# Patient Record
Sex: MALE | Race: WHITE | NOT HISPANIC OR LATINO | Employment: FULL TIME | URBAN - METROPOLITAN AREA
[De-identification: names, ages, dates, MRNs, and addresses within clinical notes are randomized per-mention and may not be internally consistent; named-entity substitution may affect disease eponyms.]

---

## 2019-06-17 ENCOUNTER — OFFICE VISIT (OUTPATIENT)
Dept: FAMILY MEDICINE | Facility: CLINIC | Age: 39
End: 2019-06-17
Payer: COMMERCIAL

## 2019-06-17 VITALS
RESPIRATION RATE: 16 BRPM | OXYGEN SATURATION: 98 % | WEIGHT: 192 LBS | DIASTOLIC BLOOD PRESSURE: 76 MMHG | SYSTOLIC BLOOD PRESSURE: 130 MMHG | TEMPERATURE: 101.2 F | HEART RATE: 86 BPM | BODY MASS INDEX: 26.88 KG/M2 | HEIGHT: 71 IN

## 2019-06-17 DIAGNOSIS — A69.20 ERYTHEMA MIGRANS (LYME DISEASE): Primary | ICD-10-CM

## 2019-06-17 PROCEDURE — 99203 OFFICE O/P NEW LOW 30 MIN: CPT | Performed by: PHYSICIAN ASSISTANT

## 2019-06-17 RX ORDER — DOXYCYCLINE 100 MG/1
100 TABLET ORAL 2 TIMES DAILY
Qty: 42 TABLET | Refills: 0 | Status: SHIPPED | OUTPATIENT
Start: 2019-06-17 | End: 2019-07-08

## 2019-06-17 ASSESSMENT — ENCOUNTER SYMPTOMS
DIARRHEA: 0
CHILLS: 0
MYALGIAS: 1
HEADACHES: 0
WHEEZING: 0
EYE DISCHARGE: 0
PALPITATIONS: 0
SHORTNESS OF BREATH: 0
EYE REDNESS: 0
FEVER: 1
COUGH: 0
SORE THROAT: 0
VOMITING: 0
ABDOMINAL PAIN: 0
NAUSEA: 0
BLURRED VISION: 0

## 2019-06-17 ASSESSMENT — MIFFLIN-ST. JEOR: SCORE: 1800.1

## 2019-06-17 NOTE — PROGRESS NOTES
Subjective     Florentin Porter is a 39 year old male who presents to clinic today for the following health issues:    HPI   Insect bites       Duration: Saturday night     Description (location/character/radiation): Insect bites- right side of back, has had several ticks on him this year     Intensity:  moderate    Accompanying signs and symptoms: started not to feel good yesterday, loss of sleep, fever started Sunday morning, body aches, headache, no nausea or vomiting, chills/sweats, bug bite has redness and swelling.     History (similar episodes/previous evaluation): None    Precipitating or alleviating factors: None    Therapies tried and outcome: Claritin, allegra topical cream          There is no problem list on file for this patient.    History reviewed. No pertinent surgical history.    Social History     Tobacco Use     Smoking status: Never Smoker     Smokeless tobacco: Never Used   Substance Use Topics     Alcohol use: No     History reviewed. No pertinent family history.      Current Outpatient Medications   Medication Sig Dispense Refill     doxycycline monohydrate (ADOXA) 100 MG tablet Take 1 tablet (100 mg) by mouth 2 times daily for 21 days 42 tablet 0     cephALEXin (KEFLEX) 500 MG capsule Take 1 capsule (500 mg) by mouth 2 times daily (Patient not taking: Reported on 6/17/2019) 10 capsule 0     No Known Allergies      Reviewed and updated as needed this visit by Provider  Tobacco  Allergies  Meds  Problems  Med Hx  Surg Hx  Fam Hx       Review of Systems   Constitutional: Positive for fever. Negative for chills and malaise/fatigue.   HENT: Negative for congestion, ear pain and sore throat.    Eyes: Negative for blurred vision, discharge and redness.   Respiratory: Negative for cough, shortness of breath and wheezing.    Cardiovascular: Negative for chest pain and palpitations.   Gastrointestinal: Negative for abdominal pain, diarrhea, nausea and vomiting.   Musculoskeletal: Positive for  "myalgias. Negative for joint pain.   Skin: Positive for rash.   Neurological: Negative for headaches.           Objective    /76   Pulse 86   Temp 101.2  F (38.4  C) (Tympanic)   Resp 16   Ht 1.791 m (5' 10.5\")   Wt 87.1 kg (192 lb)   SpO2 98%   BMI 27.16 kg/m    Body mass index is 27.16 kg/m .    Physical Exam   Constitutional: He appears well-developed and well-nourished. No distress.   HENT:   Head: Normocephalic and atraumatic.   Right Ear: Tympanic membrane and ear canal normal.   Left Ear: Tympanic membrane and ear canal normal.   Mouth/Throat: Oropharynx is clear and moist.   Eyes: Pupils are equal, round, and reactive to light. Conjunctivae are normal.   Cardiovascular: Normal rate and regular rhythm.   Pulmonary/Chest: Effort normal and breath sounds normal.   Skin: Skin is warm and dry.        Bullseye rash on right side   Psychiatric: He has a normal mood and affect. His behavior is normal.         Diagnostic Test Results:  none         Assessment & Plan     1. Erythema migrans (Lyme disease)  Will treat with doxycyline x 21 days. Continue with supportive care. Get plenty of rest and push fluids. Return to clinic if symptoms worsen or do not improve; otherwise follow up as needed      - doxycycline monohydrate (ADOXA) 100 MG tablet; Take 1 tablet (100 mg) by mouth 2 times daily for 21 days  Dispense: 42 tablet; Refill: 0       Lydia Reed PA-C  [facility]      "

## 2024-07-30 ENCOUNTER — OFFICE VISIT (OUTPATIENT)
Dept: DERMATOLOGY | Facility: CLINIC | Age: 44
End: 2024-07-30
Payer: COMMERCIAL

## 2024-07-30 DIAGNOSIS — C44.1122 BASAL CELL CARCINOMA (BCC) OF RIGHT LOWER EYELID: ICD-10-CM

## 2024-07-30 DIAGNOSIS — D23.9 DERMAL NEVUS: ICD-10-CM

## 2024-07-30 DIAGNOSIS — L81.4 LENTIGO: ICD-10-CM

## 2024-07-30 DIAGNOSIS — D23.9 DERMATOFIBROMA: Primary | ICD-10-CM

## 2024-07-30 DIAGNOSIS — D18.01 ANGIOMA OF SKIN: ICD-10-CM

## 2024-07-30 PROCEDURE — 88331 PATH CONSLTJ SURG 1 BLK 1SPC: CPT | Performed by: DERMATOLOGY

## 2024-07-30 PROCEDURE — 99203 OFFICE O/P NEW LOW 30 MIN: CPT | Mod: 25 | Performed by: DERMATOLOGY

## 2024-07-30 PROCEDURE — 11102 TANGNTL BX SKIN SINGLE LES: CPT | Performed by: DERMATOLOGY

## 2024-07-30 NOTE — PATIENT INSTRUCTIONS
Wound Care Instructions     FOR SUPERFICIAL WOUNDS     [facility] [phone]    [facility] [phone]                       AFTER 24 HOURS YOU SHOULD REMOVE THE BANDAGE AND BEGIN DAILY DRESSING CHANGES AS FOLLOWS:     1) Remove Dressing.     2) Clean and dry the area with tap water using a Q-tip or sterile gauze pad.     3) Apply Vaseline, Aquaphor, Polysporin ointment or Bacitracin ointment over entire wound.  Do NOT use Neosporin ointment.     4) Cover the wound with a band-aid, or a sterile non-stick gauze pad and micropore paper tape      REPEAT THESE INSTRUCTIONS AT LEAST ONCE A DAY UNTIL THE WOUND HAS COMPLETELY HEALED.    It is an old wives tale that a wound heals better when it is exposed to air and allowed to dry out. The wound will heal faster with a better cosmetic result if it is kept moist with ointment and covered with a bandage.    **Do not let the wound dry out.**      Supplies Needed:      *Cotton tipped applicators (Q-tips)    *Polysporin Ointment or Bacitracin Ointment (NOT NEOSPORIN)    *Band-aids or non-stick gauze pads and micropore paper tape.      PATIENT INFORMATION:    During the healing process you will notice a number of changes. All wounds develop a small halo of redness surrounding the wound.  This means healing is occurring. Severe itching with extensive redness usually indicates sensitivity to the ointment or bandage tape used to dress the wound.  You should call our office if this develops.      Swelling  and/or discoloration around your surgical site is common, particularly when performed around the eye.    All wounds normally drain.  The larger the wound the more drainage there will be.  After 7-10 days, you will notice the wound beginning to shrink and new skin will begin to grow.  The wound is healed when you can see skin has formed over the entire area.  A healed wound has a healthy, shiny look to the surface and is red to dark pink in color  to normalize.  Wounds may take approximately 4-6 weeks to heal.  Larger wounds may take 6-8 weeks.  After the wound is healed you may discontinue dressing changes.    You may experience a sensation of tightness as your wound heals. This is normal and will gradually subside.    Your healed wound may be sensitive to temperature changes. This sensitivity improves with time, but if you re having a lot of discomfort, try to avoid temperature extremes.    Patients frequently experience itching after their wound appears to have healed because of the continue healing under the skin.  Plain Vaseline will help relieve the itching.        POSSIBLE COMPLICATIONS    BLEEDING:    Leave the bandage in place.  Use tightly rolled up gauze or a cloth to apply direct pressure over the bandage for 30  minutes.  Reapply pressure for an additional 30 minutes if necessary  Use additional gauze and tape to maintain pressure once the bleeding has stopped.         Proper skin care from [facility]:    -Eliminate harsh soaps as they strip the natural oils from the skin, often resulting in dry itchy skin ( i.e. Dial, Zest, [place] Spring)  -Use mild soaps such as Cetaphil or Dove Sensitive Skin in the shower. You do not need to use soap on arms, legs, and trunk every time you shower unless visibly soiled.   -Avoid hot or cold showers.  -After showering, lightly dry off and apply moisturizing within 2-3 minutes. This will help trap moisture in the skin.   -Aggressive use of a moisturizer at least 1-2 times a day to the entire body (including -Vanicream, Cetaphil, Aquaphor or Cerave) and moisturize hands after every washing.  -We recommend using moisturizers that come in a tub that needs to be scooped out, not a pump. This has more of an oil base. It will hold moisture in your skin much better than a water base moisturizer. The above recommended are non-pore clogging.      Wear a sunscreen with at least SPF 30 on your face, ears, neck and  V of the chest daily. Wear sunscreen on other areas of the body if those areas are exposed to the sun throughout the day. Sunscreens can contain physical and/or chemical blockers. Physical blockers are less likely to clog pores, these include zinc oxide and titanium dioxide. Reapply every two hour and after swimming.     Sunscreen examples: https://www.ewg.org/sunscreen/    UV radiation  UVA radiation remains constant throughout the day and throughout the year. It is a longer wavelength than UVB and therefore penetrates deeper into the skin leading to immediate and delayed tanning, photoaging, and skin cancer. 70-80% of UVA and UVB radiation occurs between the hours of 10am-2pm.  UVB radiation  UVB radiation causes the most harmful effects and is more significant during the summer months. However, snow and ice can reflect UVB radiation leading to skin damage during the winter months as well. UVB radiation is responsible for tanning, burning, inflammation, delayed erythema (pinkness), pigmentation (brown spots), and skin cancer.     I recommend self monthly full body exams and yearly full body exams with a dermatology provider. If you develop a new or changing lesion please follow up for examination. Most skin cancers are pink and scaly or pink and pearly. However, we do see blue/brown/black skin cancers.  Consider the ABCDEs of melanoma when giving yourself your monthly full body exam ( don't forget the groin, buttocks, feet, toes, etc). A-asymmetry, B-borders, C-color, D-diameter, E-elevation or evolving. If you see any of these changes please follow up in clinic. If you cannot see your back I recommend purchasing a hand held mirror to use with a larger wall mirror.       Checking for Skin Cancer  You can find cancer early by checking your skin each month. There are 3 kinds of skin cancer. They are melanoma, basal cell carcinoma, and squamous cell carcinoma. Doing monthly skin checks is the best way to find new  marks or skin changes. Follow the instructions below for checking your skin.   The ABCDEs of checking moles for melanoma   Check your moles or growths for signs of melanoma using ABCDE:   Asymmetry: the sides of the mole or growth don t match  Border: the edges are ragged, notched, or blurred  Color: the color within the mole or growth varies  Diameter: the mole or growth is larger than 6 mm (size of a pencil eraser)  Evolving: the size, shape, or color of the mole or growth is changing (evolving is not shown in the images below)    Checking for other types of skin cancer  Basal cell carcinoma or squamous cell carcinoma have symptoms such as:     A spot or mole that looks different from all other marks on your skin  Changes in how an area feels, such as itching, tenderness, or pain  Changes in the skin's surface, such as oozing, bleeding, or scaliness  A sore that does not heal  New swelling or redness beyond the border of a mole    Who s at risk?  Anyone can get skin cancer. But you are at greater risk if you have:   Fair skin, light-colored hair, or light-colored eyes  Many moles or abnormal moles on your skin  A history of sunburns from sunlight or tanning beds  A family history of skin cancer  A history of exposure to radiation or chemicals  A weakened immune system  If you have had skin cancer in the past, you are at risk for recurring skin cancer.   How to check your skin  Do your monthly skin checkups in front of a full-length mirror. Check all parts of your body, including your:   Head (ears, face, neck, and scalp)  Torso (front, back, and sides)  Arms (tops, undersides, upper, and lower armpits)  Hands (palms, backs, and fingers, including under the nails)  Buttocks and genitals  Legs (front, back, and sides)  Feet (tops, soles, toes, including under the nails, and between toes)  If you have a lot of moles, take digital photos of them each month. Make sure to take photos both up close and from a distance.  These can help you see if any moles change over time.   Most skin changes are not cancer. But if you see any changes in your skin, call your doctor right away. Only he or she can diagnose a problem. If you have skin cancer, seeing your doctor can be the first step toward getting the treatment that could save your life.   [employer] last reviewed this educational content on 4/1/2019 2000-2020 [employer]. [address] [place] [address], [place] [address]. All rights reserved. This information is not intended as a substitute for professional medical care. Always follow your healthcare professional's instructions.       When should I call my doctor?  If you are worsening or not improving, please, contact us or seek urgent care as noted below.     Who should I call with questions (adults)?    [facility] [phone]  For urgent needs outside of business hours call the [facility] at [phone] and ask for the dermatology resident on call to be paged  If this is a medical emergency and you are unable to reach an ER, Call [phone]      If you need a prescription refill, please contact your pharmacy. Refills are approved or denied by our Physicians during normal business hours, Monday through Fridays  Per office policy, refills will not be granted if you have not been seen within the past year (or sooner depending on your child's condition)

## 2024-07-30 NOTE — PROGRESS NOTES
Florentin Porter is an extremely pleasant 44 year old year old male patient here today for spot on right eyelid and back.  Patient has no other skin complaints today.  Remainder of the HPI, Meds, PMH, Allergies, FH, and SH was reviewed in chart.    History reviewed. No pertinent past medical history.    No past surgical history on file.     Family History   Problem Relation Age of Onset    Skin Cancer Father     Skin Cancer Paternal Grandfather        Social History     Socioeconomic History    Marital status:      Spouse name: Not on file    Number of children: Not on file    Years of education: Not on file    Highest education level: Not on file   Occupational History    Not on file   Tobacco Use    Smoking status: Never    Smokeless tobacco: Never   Substance and Sexual Activity    Alcohol use: No    Drug use: No    Sexual activity: Not on file   Other Topics Concern    Parent/sibling w/ CABG, MI or angioplasty before 65F 55M? Not Asked   Social History Narrative    Not on file     Social Determinants of Health     Financial Resource Strain: Not on file   Food Insecurity: Not on file   Transportation Needs: Not on file   Physical Activity: Not on file   Stress: Not on file   Social Connections: Not on file   Interpersonal Safety: Not on file   Housing Stability: Not on file       Outpatient Encounter Medications as of 7/30/2024   Medication Sig Dispense Refill    cephALEXin (KEFLEX) 500 MG capsule Take 1 capsule (500 mg) by mouth 2 times daily (Patient not taking: Reported on 6/17/2019) 10 capsule 0     No facility-administered encounter medications on file as of 7/30/2024.             O:   NAD, WDWN, Alert & Oriented, Mood & Affect wnl, Vitals stable   General appearance normal   Vitals stable   Alert, oriented and in no acute distress   R lower lid 6mm pink pearly papule   L shoulder firm papule      brown macules on trunk and ext   Red papules on trunk  Flesh colored papules on trunk         Eyes:  Conjunctivae/lids:Normal     ENT: Lips, mucosa: normal    MSK:Normal    Cardiovascular: peripheral edema none    Pulm: Breathing Normal    Neuro/Psych: Orientation:Alert and Orientedx3 ; Mood/Affect:normal       MICRO:   R lower lid:Orthokeratosis of epidermis with a proliferation of nests of basaloid cells, with peripheral palisading and a haphazard arrangement in the center extending into the dermis, forming nodules.  The tumor cells have hyperchromatic nuclei. Poor cytoplasm and intercellular bridging.    A/P:  1. Dermatofibroma, lentigo, angioma, dermal nevus  2. R lower lid r/o basal cell carcinoma   TANGENTIAL BIOPSY IN HOUSE:  After consent, anesthesia with LEC and prep, tangential excision performed and dx above confirmed with frozen section histology.  No complications and routine wound care.  Patient is not on  anticoagulants and risk of bleeding discussed with patient.       I have personally reviewed all specimens and/or slides and used them with my medical judgement to determine or confirm the final diagnosis.     Patient told result basal cell carcinoma   Schedule excision .      It was a pleasure speaking to Florentin Porter today.  Previous clinic notes and pertinent laboratory tests were reviewed prior to Florentin Porter's visit.  Nature and genetics of benign skin lesions dicussed with patient.  Signs and Symptoms of skin cancer discussed with patient.  Patient encouraged to perform monthly skin exams.  UV precautions reviewed with patient.  Return to clinic next appt

## 2024-07-30 NOTE — LETTER
7/30/2024      Florentin Porter  [address]  [facility] [address]      Dear Colleague,    Thank you for referring your patient, Florentin Porter, to the [facility]. Please see a copy of my visit note below.    Florentin Porter is an extremely pleasant 44 year old year old male patient here today for spot on right eyelid and back.  Patient has no other skin complaints today.  Remainder of the HPI, Meds, PMH, Allergies, FH, and SH was reviewed in chart.    History reviewed. No pertinent past medical history.    No past surgical history on file.     Family History   Problem Relation Age of Onset     Skin Cancer Father      Skin Cancer Paternal Grandfather        Social History     Socioeconomic History     Marital status:      Spouse name: Not on file     Number of children: Not on file     Years of education: Not on file     Highest education level: Not on file   Occupational History     Not on file   Tobacco Use     Smoking status: Never     Smokeless tobacco: Never   Substance and Sexual Activity     Alcohol use: No     Drug use: No     Sexual activity: Not on file   Other Topics Concern     Parent/sibling w/ CABG, MI or angioplasty before 65F 55M? Not Asked   Social History Narrative     Not on file     Social Determinants of Health     Financial Resource Strain: Not on file   Food Insecurity: Not on file   Transportation Needs: Not on file   Physical Activity: Not on file   Stress: Not on file   Social Connections: Not on file   Interpersonal Safety: Not on file   Housing Stability: Not on file       Outpatient Encounter Medications as of 7/30/2024   Medication Sig Dispense Refill     cephALEXin (KEFLEX) 500 MG capsule Take 1 capsule (500 mg) by mouth 2 times daily (Patient not taking: Reported on 6/17/2019) 10 capsule 0     No facility-administered encounter medications on file as of 7/30/2024.             O:   NAD, WDWN, Alert & Oriented, Mood & Affect wnl, Vitals stable   General  appearance normal   Vitals stable   Alert, oriented and in no acute distress   R lower lid 6mm pink pearly papule   L shoulder firm papule      brown macules on trunk and ext   Red papules on trunk  Flesh colored papules on trunk         Eyes: Conjunctivae/lids:Normal     ENT: Lips, mucosa: normal    MSK:Normal    Cardiovascular: peripheral edema none    Pulm: Breathing Normal    Neuro/Psych: Orientation:Alert and Orientedx3 ; Mood/Affect:normal       MICRO:   R lower lid:Orthokeratosis of epidermis with a proliferation of nests of basaloid cells, with peripheral palisading and a haphazard arrangement in the center extending into the dermis, forming nodules.  The tumor cells have hyperchromatic nuclei. Poor cytoplasm and intercellular bridging.    A/P:  1. Dermatofibroma, lentigo, angioma, dermal nevus  2. R lower lid r/o basal cell carcinoma   TANGENTIAL BIOPSY IN HOUSE:  After consent, anesthesia with LEC and prep, tangential excision performed and dx above confirmed with frozen section histology.  No complications and routine wound care.  Patient is not on  anticoagulants and risk of bleeding discussed with patient.       I have personally reviewed all specimens and/or slides and used them with my medical judgement to determine or confirm the final diagnosis.     Patient told result basal cell carcinoma   Schedule excision .      It was a pleasure speaking to Florentin Poretr today.  Previous clinic notes and pertinent laboratory tests were reviewed prior to Florentin Porter's visit.  Nature and genetics of benign skin lesions dicussed with patient.  Signs and Symptoms of skin cancer discussed with patient.  Patient encouraged to perform monthly skin exams.  UV precautions reviewed with patient.  Return to clinic next appt      Again, thank you for allowing me to participate in the care of your patient.        Sincerely,        Harsha Raman MD

## 2024-07-30 NOTE — LETTER
[facility]  [address] [place] [address]  [phone]      July 30, 2024    Florentin Porter  [address] [place] [address]      Dear Florentin      You are scheduled for Mohs Surgery on     Please check in at 2nd floor Dermatology Clinic.     Be sure to eat a good breakfast and bathe and wash your hair prior to Surgery. Please bring  with you if this is above your neck    If you are taking any anti-coagulants that are prescribed by your Doctor (such as Coumadin/warfarin, Plavix, Aspirin, Ibuprofen), please continue taking them.     However, If you are taking anti-coagulants over the counter without  a Doctor's order for a Medical condition, please discontinue them 10 days prior to Surgery.      Please wear loose comfortable clothing as it could possibly be 4-6 hours until your surgery is completed depending upon how many layers of tissue need to be removed.     Thank you,    Harsha Raman MD

## 2024-08-07 ENCOUNTER — OFFICE VISIT (OUTPATIENT)
Dept: DERMATOLOGY | Facility: CLINIC | Age: 44
End: 2024-08-07
Payer: COMMERCIAL

## 2024-08-07 DIAGNOSIS — C44.1122 BASAL CELL CARCINOMA (BCC) OF RIGHT LOWER EYELID: ICD-10-CM

## 2024-08-07 PROCEDURE — 13152 CMPLX RPR E/N/E/L 2.6-7.5 CM: CPT | Performed by: DERMATOLOGY

## 2024-08-07 PROCEDURE — 17311 MOHS 1 STAGE H/N/HF/G: CPT | Performed by: DERMATOLOGY

## 2024-08-07 NOTE — PROGRESS NOTES
Florentin Porter is an extremely pleasant 44 year old year old male patient here today for evaluation and managment of basal cell carcinoma on right lower eyelid.  Patient has no other skin complaints today.  Remainder of the HPI, Meds, PMH, Allergies, FH, and SH was reviewed in chart.      Past Medical History:   Diagnosis Date    Basal cell carcinoma        History reviewed. No pertinent surgical history.     Family History   Problem Relation Age of Onset    Skin Cancer Father     Skin Cancer Paternal Grandfather        Social History     Socioeconomic History    Marital status:      Spouse name: Not on file    Number of children: Not on file    Years of education: Not on file    Highest education level: Not on file   Occupational History    Not on file   Tobacco Use    Smoking status: Never    Smokeless tobacco: Never   Substance and Sexual Activity    Alcohol use: No    Drug use: No    Sexual activity: Not on file   Other Topics Concern    Parent/sibling w/ CABG, MI or angioplasty before 65F 55M? Not Asked   Social History Narrative    Not on file     Social Determinants of Health     Financial Resource Strain: Not on file   Food Insecurity: Not on file   Transportation Needs: Not on file   Physical Activity: Not on file   Stress: Not on file   Social Connections: Not on file   Interpersonal Safety: Not on file   Housing Stability: Not on file       Outpatient Encounter Medications as of 8/7/2024   Medication Sig Dispense Refill    cephALEXin (KEFLEX) 500 MG capsule Take 1 capsule (500 mg) by mouth 2 times daily (Patient not taking: Reported on 6/17/2019) 10 capsule 0     No facility-administered encounter medications on file as of 8/7/2024.             O:   NAD, WDWN, Alert & Oriented, Mood & Affect wnl, Vitals stable   General appearance normal   Vitals stable   Alert, oriented and in no acute distress     R lower eyelid 6mm scaly papule       Eyes: Conjunctivae/lids:Normal     ENT: Lips, mucosa:  normal    MSK:Normal    Cardiovascular: peripheral edema none    Pulm: Breathing Normal    Neuro/Psych: Orientation:Alert and Orientedx3 ; Mood/Affect:normal       A/P:  R lower lid basal cell carcinoma   MOHS:   Location    The rationale for Mohs surgery was discussed with the patient and consent was obtained.  The risks and benefits as well as alternatives to therapy were discussed, in detail.  Specifically, the risks of infection, scarring, bleeding, prolonged wound healing, incomplete removal, allergy to anesthesia, nerve injury and recurrence were addressed.  Indication for Mohs was Location. Prior to the procedure, the treatment site was clearly identified and, if available, confirmed with previous photos and confirmed by the patient   All components of the Universal Protocol/PAUSE rule were completed.  The Mohs surgeon operated in two distinct and integrated capacities as the surgeon and pathologist.      The area was prepped with Betasept.  A rim of normal appearing skin was marked circumferentially around the lesion.  The area was infiltrated with local anesthesia.  The tumor was first debulked to remove all clinically apparent tumor.  An incision following the standard Mohs approach was done and the specimen was oriented,mapped and placed in 1 block(s).  Each specimen was then chromacoded and processed in the Mohs laboratory using standard Mohs technique and submitted for frozen section histology.  Frozen section analysis showed no residual tumor but CLEAR MARGINS.      The tumor was excised using standard Mohs technique in 1 stages(s).  CLEAR MARGINS OBTAINED and Final defect size was 1 cm.     We discussed the options for wound management in full with the patient including risks/benefits/ possible outcomes.      REPAIR COMPLEX: Because of the tightness of the surrounding skin and Because of the size and full thickness nature of the defect, Because of the tightness of the surrounding skin, To maintain  form and function, In order to avoid distortion, and Because of the proximity to the lid, a complex closure was planned. After LE anesthesia and prep, Burow's triangles were excised in the relaxed skin tension lines. The wound edges were widely undermined greater than width of the defect on both sides by dissection in the subcutaneous plane until adequate tissue mobility was obtained. Hemostasis was obtained. The wound edges were closed in a layered fashion using Vicryl and Fast Absorbing Plain Gut sutures. Postoperative length was 2.7 cm.   EBL minimal; complications none; wound care routine.  The patient was discharged in good condition and will return in one week for wound evaluation.  It was a pleasure speaking to Florentin Porter today.  Previous clinic notes and pertinent laboratory tests were reviewed prior to Florentin Porter's visit.  Signs and Symptoms of skin cancer discussed with patient.  Patient encouraged to perform monthly skin exams.  UV precautions reviewed with patient.  Risks of non-melanoma skin cancer discussed with patient   Return to clinic 6 months

## 2024-08-07 NOTE — PATIENT INSTRUCTIONS
Sutured Wound Care   [facility]: [phone]  [facility]: [phone]    Right Lower Eyelid    No strenuous activity for 48 hours. Resume moderate activity in 48 hours. No heavy exercising until you are seen for follow up in one week.     Take Tylenol as needed for discomfort.                         Do not drink alcoholic beverages for 48 hours.     Keep the pressure bandage in place for 24 hours. If the bandage becomes blood tinged or loose, reinforce it with gauze and tape.        (Refer to the reverse side of this page for management of bleeding).    Remove pressure bandage in 24 hours (White Gauze & White Tape)     Leave the flat bandage in place until your follow up appointment. (Brown Flesh Color Tape & Steri Strips)     Keep the bandage dry. Wash around it carefully.    If the tape becomes soiled or starts to come off, reinforce it with additional paper tape.    Do not smoke for 3 weeks; smoking is detrimental to wound healing.    It is normal to have swelling and bruising around the surgical site. The bruising will fade in approximately 10-14 days. Elevate the area to reduce swelling.    Numbness, itchiness and sensitivity to temperature changes can occur after surgery and may take up to 18 months to normalize.      POSSIBLE COMPLICATIONS    BLEEDING:    Leave the bandage in place.  Use tightly rolled up gauze or a cloth to apply direct pressure over the bandage for 20   minutes.  Reapply pressure for an additional 20 minutes if necessary  Call the office or go to the nearest emergency room if pressure fails to stop the bleeding.  Use additional gauze and tape to maintain pressure once the bleeding has stopped.    PAIN:    Post operative pain should slowly get better, never worse.  A severe increase in pain may indicate a problem. Call the office if this occurs.    In case of emergency phone:Dr Raman [phone]

## 2024-08-07 NOTE — LETTER
8/7/2024      Florentin Porter  [address]  [facility] [address]      Dear Colleague,    Thank you for referring your patient, Florentin Porter, to the [facility]. Please see a copy of my visit note below.    Surgical Office Location :   [facility]  [address] [place] [address]      Florentin Porter is an extremely pleasant 44 year old year old male patient here today for evaluation and managment of basal cell carcinoma on right lower eyelid.  Patient has no other skin complaints today.  Remainder of the HPI, Meds, PMH, Allergies, FH, and SH was reviewed in chart.      Past Medical History:   Diagnosis Date     Basal cell carcinoma        History reviewed. No pertinent surgical history.     Family History   Problem Relation Age of Onset     Skin Cancer Father      Skin Cancer Paternal Grandfather        Social History     Socioeconomic History     Marital status:      Spouse name: Not on file     Number of children: Not on file     Years of education: Not on file     Highest education level: Not on file   Occupational History     Not on file   Tobacco Use     Smoking status: Never     Smokeless tobacco: Never   Substance and Sexual Activity     Alcohol use: No     Drug use: No     Sexual activity: Not on file   Other Topics Concern     Parent/sibling w/ CABG, MI or angioplasty before 65F 55M? Not Asked   Social History Narrative     Not on file     Social Determinants of Health     Financial Resource Strain: Not on file   Food Insecurity: Not on file   Transportation Needs: Not on file   Physical Activity: Not on file   Stress: Not on file   Social Connections: Not on file   Interpersonal Safety: Not on file   Housing Stability: Not on file       Outpatient Encounter Medications as of 8/7/2024   Medication Sig Dispense Refill     cephALEXin (KEFLEX) 500 MG capsule Take 1 capsule (500 mg) by mouth 2 times daily (Patient not taking: Reported on 6/17/2019) 10 capsule 0      No facility-administered encounter medications on file as of 8/7/2024.             O:   NAD, WDWN, Alert & Oriented, Mood & Affect wnl, Vitals stable   General appearance normal   Vitals stable   Alert, oriented and in no acute distress     R lower eyelid 6mm scaly papule       Eyes: Conjunctivae/lids:Normal     ENT: Lips, mucosa: normal    MSK:Normal    Cardiovascular: peripheral edema none    Pulm: Breathing Normal    Neuro/Psych: Orientation:Alert and Orientedx3 ; Mood/Affect:normal       A/P:  R lower lid basal cell carcinoma   MOHS:   Location    The rationale for Mohs surgery was discussed with the patient and consent was obtained.  The risks and benefits as well as alternatives to therapy were discussed, in detail.  Specifically, the risks of infection, scarring, bleeding, prolonged wound healing, incomplete removal, allergy to anesthesia, nerve injury and recurrence were addressed.  Indication for Mohs was Location. Prior to the procedure, the treatment site was clearly identified and, if available, confirmed with previous photos and confirmed by the patient   All components of the Universal Protocol/PAUSE rule were completed.  The Mohs surgeon operated in two distinct and integrated capacities as the surgeon and pathologist.      The area was prepped with Betasept.  A rim of normal appearing skin was marked circumferentially around the lesion.  The area was infiltrated with local anesthesia.  The tumor was first debulked to remove all clinically apparent tumor.  An incision following the standard Mohs approach was done and the specimen was oriented,mapped and placed in 1 block(s).  Each specimen was then chromacoded and processed in the Mohs laboratory using standard Mohs technique and submitted for frozen section histology.  Frozen section analysis showed no residual tumor but CLEAR MARGINS.      The tumor was excised using standard Mohs technique in 1 stages(s).  CLEAR MARGINS OBTAINED and Final  defect size was 1 cm.     We discussed the options for wound management in full with the patient including risks/benefits/ possible outcomes.      REPAIR COMPLEX: Because of the tightness of the surrounding skin and Because of the size and full thickness nature of the defect, Because of the tightness of the surrounding skin, To maintain form and function, In order to avoid distortion, and Because of the proximity to the lid, a complex closure was planned. After LE anesthesia and prep, Burow's triangles were excised in the relaxed skin tension lines. The wound edges were widely undermined greater than width of the defect on both sides by dissection in the subcutaneous plane until adequate tissue mobility was obtained. Hemostasis was obtained. The wound edges were closed in a layered fashion using Vicryl and Fast Absorbing Plain Gut sutures. Postoperative length was 2.7 cm.   EBL minimal; complications none; wound care routine.  The patient was discharged in good condition and will return in one week for wound evaluation.  It was a pleasure speaking to Florentin Porter today.  Previous clinic notes and pertinent laboratory tests were reviewed prior to Florentin Porter's visit.  Signs and Symptoms of skin cancer discussed with patient.  Patient encouraged to perform monthly skin exams.  UV precautions reviewed with patient.  Risks of non-melanoma skin cancer discussed with patient   Return to clinic 6 months        Again, thank you for allowing me to participate in the care of your patient.        Sincerely,        Harsha Raman MD

## 2024-08-14 ENCOUNTER — ALLIED HEALTH/NURSE VISIT (OUTPATIENT)
Dept: DERMATOLOGY | Facility: CLINIC | Age: 44
End: 2024-08-14
Payer: COMMERCIAL

## 2024-08-14 DIAGNOSIS — Z48.00 ENCOUNTER FOR CHANGE OF DRESSING: Primary | ICD-10-CM

## 2024-08-14 PROCEDURE — 99207 PR NO CHARGE NURSE ONLY: CPT

## 2024-08-14 NOTE — PATIENT INSTRUCTIONS
WOUND CARE INSTRUCTIONS  for  ONE WEEK AFTER SURGERY          Leave flat bandage (brown Tape)  on your skin for one week after today s bandage change.  On 8/21 when you remove the bandage, you may resume your regular skin care routine, including washing with mild soap and water, applying moisturizer, make-up and sunscreen.    If there are any open or bleeding areas at the incision/graft site you should begin to cover the area with a bandage daily as follows:    Clean and dry the area with plain tap water using a Q-tip or sterile gauze pad.  Apply Polysporin or Bacitracin ointment to the open area.  Cover the wound with a band-aid or a sterile non-stick gauze pad and micropore paper tape.         SIGNS OF INFECTION  - If you notice any of these signs of infection, call your doctor right away: expanding redness around the wound.  - Yellow or greenish-colored pus or cloudy wound drainage.    - Red streaking spreading from the wound.  - Increased swelling, tenderness, or pain around the wound.   - Fever.    Please remember that yellow and clear drainage from a wound can be normal and related to normal wound healing.  Isolated drainage from a wound without a combination of the above features does not indicate infection.       *Once the bandages are removed, the scar will be red and firm (especially in the lip/chin area). This is normal and will fade in time. It might take 6-12 months for this to happen.     *Massaging the area will help the scar soften and fade quicker. Begin to massage the area one month after the bandages have been removed. To massage apply pressure directly and firmly over the scar with the fingertips and move in a circular motion. Massage the area for a few minutes several times a day. Continue to massage the site for several months.    *Approximately 6-8 weeks after surgery it is not uncommon to see the formation of  tender pimple-like  bump along the scar. This is normal. As the scar continues to  mature and the stitches underneath the skin begin to dissolve, this might occur. Do not pick or squeeze, this will resolve on it s own. Should one break open producing a small amount of drainage, apply Polysporin or Bacitracin ointment a few times a day until the wound is completely healed.    *Numbness in the surgical area is expected. It might take 12-18 months for the feeling to return to normal. During this time sensations of itchiness, tingling and occasional sharp pains might be noted. These feelings are normal and will subside once the nerves have completely healed.         IN CASE OF EMERGENCY: Dr Raman [phone]     If you were seen in [place] call: [phone]    If you were seen in [place] call: [phone]

## 2024-08-14 NOTE — PROGRESS NOTES
Florentin Porter comes into clinic today at the request of Danisha Ordering Provider for Wound Check Action taken: See Below.    This service provided today was under the supervising provider of the enma Raman, who was available if needed.    Pt returned to clinic for post surgery 1 week follow up bandage change. Pt has no complaints, denies pain. Bandage removed from right lower eyelid, area cleansed with normal saline. Site is healing and wound edges approximating well. Reapplied new steri strips and paper tape.    Advised to watch for signs/sx of infection; spreading redness, drainage, odor, fever. Call or report promptly to clinic. Pt given written instructions and informed to rtc as needed. Patient verbalized understanding.     Nataly Chaves MA

## 2024-11-03 ENCOUNTER — HEALTH MAINTENANCE LETTER (OUTPATIENT)
Age: 44
End: 2024-11-03

## 2025-02-10 ENCOUNTER — OFFICE VISIT (OUTPATIENT)
Dept: DERMATOLOGY | Facility: CLINIC | Age: 45
End: 2025-02-10
Payer: COMMERCIAL

## 2025-02-10 DIAGNOSIS — D22.9 NEVUS: Primary | ICD-10-CM

## 2025-02-10 DIAGNOSIS — L81.4 LENTIGO: ICD-10-CM

## 2025-02-10 DIAGNOSIS — Z85.828 HISTORY OF BASAL CELL CARCINOMA (BCC): ICD-10-CM

## 2025-02-10 DIAGNOSIS — D18.01 ANGIOMA OF SKIN: ICD-10-CM

## 2025-02-10 DIAGNOSIS — L82.1 SEBORRHEIC KERATOSIS: ICD-10-CM

## 2025-02-10 DIAGNOSIS — L57.0 ACTINIC KERATOSIS: ICD-10-CM

## 2025-02-10 PROCEDURE — 17000 DESTRUCT PREMALG LESION: CPT | Performed by: PHYSICIAN ASSISTANT

## 2025-02-10 PROCEDURE — 99213 OFFICE O/P EST LOW 20 MIN: CPT | Mod: 25 | Performed by: PHYSICIAN ASSISTANT

## 2025-02-10 NOTE — LETTER
2/10/2025      Florentin Porter  [address] [facility] [place] [address]      Dear Colleague,    Thank you for referring your patient, Florentin Porter, to the [facility]. Please see a copy of my visit note below.    HPI:   Chief complaints: Florentin Porter is a pleasant 44 year old male who presents for Full skin cancer screening to rule out skin cancer   Last Skin Exam: n/a      1st Baseline: yes  Personal HX of Skin Cancer: yes BCC on the right lower eyelid 7/2024   Personal HX of Malignant Melanoma: no   Family HX of Skin Cancer / Malignant Melanoma: yes father and grandfather with NMSC  Personal HX of Atypical Moles:   no  Risk factors: history of sun exposure and burns  New / Changing lesions: yes scaly spot on the right ear  Social History: has a hobby farm in [place]  On review of systems, there are no further skin complaints, patient is feeling otherwise well.   ROS of the following were done and are negative: Constitutional, Eyes, Ears, Nose,   Mouth, Throat, Cardiovascular, Respiratory, GI, Genitourinary, Musculoskeletal,   Psychiatric, Endocrine, Allergic/Immunologic.    PHYSICAL EXAM:   There were no vitals taken for this visit.  Skin exam performed as follows: Type 2 skin. Mood appropriate  Alert and Oriented X 3. Well developed, well nourished in no distress.  General appearance: Normal  Head including face: Normal  Eyes: conjunctiva and lids: Normal  Mouth: Lips, teeth, gums: Normal  Neck: Normal  Chest-breast/axillae: Normal  Back: Normal  Extremities: digits/nails (clubbing): Normal  Eccrine and Apocrine glands: Normal  Right upper extremity: Normal  Left upper extremity: Normal  Right lower extremity: Normal  Left lower extremity: Normal  Skin: Scalp and body hair: See below    Pt deferred exam of breasts, groin, buttocks: No    Other physical findings:  1. Multiple pigmented macules on extremities and trunk  2. Multiple pigmented macules on face, trunk and extremities  3. Multiple  vascular papules on trunk, arms and legs  4. Multiple scattered keratotic plaques  5. Pink gritty papule on the right helix x 1       Except as noted above, no other signs of skin cancer or melanoma.     ASSESSMENT/PLAN:   Benign Full skin cancer screening today. . Patient with history of BCC  Advised on monthly self exams and 1 year  Patient Education: Appropriate brochures given.    Multiple benign appearing melanocytic nevi on arms, legs and trunk. Discussed ABCDEs of melanoma and sunscreen.   Multiple lentigos on arms, legs and trunk. Advised benign, no treatment needed.  Multiple scattered angiomas. Advised benign, no treatment needed.   Seborrheic keratosis on arms, legs and trunk. Advised benign, no treatment needed.  Actinic keratosis on the right helix x 1. As precancerous, cryosurgery performed. Advised on blistering and post-op care. Advised if not resolved in 1-2 months to return for evaluation            Follow-up: yearly/PRN sooner    1.) Patient was asked about new and changing moles. YES  2.) Patient received a complete physical skin examination: YES  3.) Patient was counseled to perform a monthly self skin examination: YES  Scribed By: Maria Dolores Mercedes, MS, PAAmandaC      Again, thank you for allowing me to participate in the care of your patient.        Sincerely,        Maria Dolores Mercedes PA-C    Electronically signed

## 2025-02-10 NOTE — PROGRESS NOTES
HPI:   Chief complaints: Florentin Porter is a pleasant 44 year old male who presents for Full skin cancer screening to rule out skin cancer   Last Skin Exam: n/a      1st Baseline: yes  Personal HX of Skin Cancer: yes BCC on the right lower eyelid 7/2024   Personal HX of Malignant Melanoma: no   Family HX of Skin Cancer / Malignant Melanoma: yes father and grandfather with NMSC  Personal HX of Atypical Moles:   no  Risk factors: history of sun exposure and burns  New / Changing lesions: yes scaly spot on the right ear  Social History: has a hobby farm in [place]  On review of systems, there are no further skin complaints, patient is feeling otherwise well.   ROS of the following were done and are negative: Constitutional, Eyes, Ears, Nose,   Mouth, Throat, Cardiovascular, Respiratory, GI, Genitourinary, Musculoskeletal,   Psychiatric, Endocrine, Allergic/Immunologic.    PHYSICAL EXAM:   There were no vitals taken for this visit.  Skin exam performed as follows: Type 2 skin. Mood appropriate  Alert and Oriented X 3. Well developed, well nourished in no distress.  General appearance: Normal  Head including face: Normal  Eyes: conjunctiva and lids: Normal  Mouth: Lips, teeth, gums: Normal  Neck: Normal  Chest-breast/axillae: Normal  Back: Normal  Extremities: digits/nails (clubbing): Normal  Eccrine and Apocrine glands: Normal  Right upper extremity: Normal  Left upper extremity: Normal  Right lower extremity: Normal  Left lower extremity: Normal  Skin: Scalp and body hair: See below    Pt deferred exam of breasts, groin, buttocks: No    Other physical findings:  1. Multiple pigmented macules on extremities and trunk  2. Multiple pigmented macules on face, trunk and extremities  3. Multiple vascular papules on trunk, arms and legs  4. Multiple scattered keratotic plaques  5. Pink gritty papule on the right helix x 1       Except as noted above, no other signs of skin cancer or melanoma.     ASSESSMENT/PLAN:   Benign Full  skin cancer screening today. . Patient with history of BCC  Advised on monthly self exams and 1 year  Patient Education: Appropriate brochures given.    Multiple benign appearing melanocytic nevi on arms, legs and trunk. Discussed ABCDEs of melanoma and sunscreen.   Multiple lentigos on arms, legs and trunk. Advised benign, no treatment needed.  Multiple scattered angiomas. Advised benign, no treatment needed.   Seborrheic keratosis on arms, legs and trunk. Advised benign, no treatment needed.  Actinic keratosis on the right helix x 1. As precancerous, cryosurgery performed. Advised on blistering and post-op care. Advised if not resolved in 1-2 months to return for evaluation            Follow-up: yearly/PRN sooner    1.) Patient was asked about new and changing moles. YES  2.) Patient received a complete physical skin examination: YES  3.) Patient was counseled to perform a monthly self skin examination: YES  Scribed By: Maria Dolores Mercedes MS, PAAZEEM